# Patient Record
Sex: FEMALE | URBAN - METROPOLITAN AREA
[De-identification: names, ages, dates, MRNs, and addresses within clinical notes are randomized per-mention and may not be internally consistent; named-entity substitution may affect disease eponyms.]

---

## 2018-08-24 ENCOUNTER — NEW PATIENT (OUTPATIENT)
Dept: URBAN - METROPOLITAN AREA CLINIC 52 | Facility: CLINIC | Age: 53
End: 2018-08-24

## 2018-08-24 VITALS — HEART RATE: 68 BPM | SYSTOLIC BLOOD PRESSURE: 156 MMHG | HEIGHT: 55 IN | DIASTOLIC BLOOD PRESSURE: 81 MMHG

## 2018-08-24 DIAGNOSIS — H43.393: ICD-10-CM

## 2018-08-24 DIAGNOSIS — Z79.4: ICD-10-CM

## 2018-08-24 DIAGNOSIS — E11.3213: ICD-10-CM

## 2018-08-24 DIAGNOSIS — H25.13: ICD-10-CM

## 2018-08-24 PROCEDURE — 99204 OFFICE O/P NEW MOD 45 MIN: CPT

## 2018-08-24 PROCEDURE — 92134 CPTRZ OPH DX IMG PST SGM RTA: CPT

## 2018-08-24 PROCEDURE — G8427 DOCREV CUR MEDS BY ELIG CLIN: HCPCS

## 2018-08-24 PROCEDURE — G8482 FLU IMMUNIZE ORDER/ADMIN: HCPCS

## 2018-08-24 PROCEDURE — 1036F TOBACCO NON-USER: CPT

## 2018-08-24 PROCEDURE — 2022F DILAT RTA XM EVC RTNOPTHY: CPT

## 2018-08-24 PROCEDURE — 4040F PNEUMOC VAC/ADMIN/RCVD: CPT | Mod: 8P

## 2018-08-24 PROCEDURE — 92225 OPHTHALMOSCOPY (INITIAL): CPT

## 2018-08-24 ASSESSMENT — VISUAL ACUITY
OS_CC: 20/30-2
OS_CC: 20/50+2
OD_CC: 20/30-2
OD_CC: 20/30

## 2018-08-24 ASSESSMENT — TONOMETRY
OS_IOP_MMHG: 20
OD_IOP_MMHG: 22

## 2018-09-07 ENCOUNTER — FOLLOW UP (OUTPATIENT)
Dept: URBAN - METROPOLITAN AREA CLINIC 52 | Facility: CLINIC | Age: 53
End: 2018-09-07

## 2018-09-07 DIAGNOSIS — E11.3213: ICD-10-CM

## 2018-09-07 DIAGNOSIS — Z79.4: ICD-10-CM

## 2018-09-07 PROCEDURE — 92014 COMPRE OPH EXAM EST PT 1/>: CPT | Mod: 25

## 2018-09-07 PROCEDURE — 92250 FUNDUS PHOTOGRAPHY W/I&R: CPT

## 2018-09-07 PROCEDURE — G8397 DIL MACULA/FUNDUS EXAM/W DOC: HCPCS

## 2018-09-07 PROCEDURE — 2021F DILAT MACULAR EXAM DONE: CPT

## 2018-09-07 PROCEDURE — 1036F TOBACCO NON-USER: CPT

## 2018-09-07 PROCEDURE — 5010F MACUL RESULT PHY/QHP MNG DM: CPT

## 2018-09-07 PROCEDURE — G8482 FLU IMMUNIZE ORDER/ADMIN: HCPCS

## 2018-09-07 PROCEDURE — G8427 DOCREV CUR MEDS BY ELIG CLIN: HCPCS

## 2018-09-07 PROCEDURE — 2022F DILAT RTA XM EVC RTNOPTHY: CPT

## 2018-09-07 PROCEDURE — G9903 PT SCRN TBCO ID AS NON USER: HCPCS

## 2018-09-07 PROCEDURE — 67028 INJECTION EYE DRUG: CPT

## 2018-09-07 PROCEDURE — 4040F PNEUMOC VAC/ADMIN/RCVD: CPT | Mod: 8P

## 2018-09-07 PROCEDURE — 92235 FLUORESCEIN ANGRPH MLTIFRAME: CPT

## 2018-09-07 ASSESSMENT — VISUAL ACUITY
OD_CC: 20/40
OS_CC: 20/30

## 2018-09-07 ASSESSMENT — TONOMETRY
OS_IOP_MMHG: 14
OD_IOP_MMHG: 18

## 2018-09-21 ENCOUNTER — PROCEDURE ONLY (OUTPATIENT)
Dept: URBAN - METROPOLITAN AREA CLINIC 52 | Facility: CLINIC | Age: 53
End: 2018-09-21

## 2018-09-21 DIAGNOSIS — E11.3213: ICD-10-CM

## 2018-09-21 DIAGNOSIS — Z79.4: ICD-10-CM

## 2018-09-21 PROCEDURE — 2022F DILAT RTA XM EVC RTNOPTHY: CPT

## 2018-09-21 PROCEDURE — G8427 DOCREV CUR MEDS BY ELIG CLIN: HCPCS

## 2018-09-21 PROCEDURE — 4040F PNEUMOC VAC/ADMIN/RCVD: CPT | Mod: 8P

## 2018-09-21 PROCEDURE — G8397 DIL MACULA/FUNDUS EXAM/W DOC: HCPCS

## 2018-09-21 PROCEDURE — 67210 TREATMENT OF RETINAL LESION: CPT

## 2018-09-21 PROCEDURE — 1036F TOBACCO NON-USER: CPT

## 2018-09-21 PROCEDURE — G8482 FLU IMMUNIZE ORDER/ADMIN: HCPCS

## 2018-09-21 PROCEDURE — G9903 PT SCRN TBCO ID AS NON USER: HCPCS

## 2018-09-21 PROCEDURE — 5010F MACUL RESULT PHY/QHP MNG DM: CPT

## 2018-09-21 ASSESSMENT — TONOMETRY
OS_IOP_MMHG: 13
OD_IOP_MMHG: 16

## 2018-09-21 ASSESSMENT — VISUAL ACUITY
OD_CC: 20/40
OS_CC: 20/30-1

## 2018-10-05 ENCOUNTER — FOLLOW UP (OUTPATIENT)
Dept: URBAN - METROPOLITAN AREA CLINIC 52 | Facility: CLINIC | Age: 53
End: 2018-10-05

## 2018-10-05 DIAGNOSIS — H43.393: ICD-10-CM

## 2018-10-05 DIAGNOSIS — E11.3213: ICD-10-CM

## 2018-10-05 DIAGNOSIS — Z79.4: ICD-10-CM

## 2018-10-05 PROCEDURE — G8482 FLU IMMUNIZE ORDER/ADMIN: HCPCS

## 2018-10-05 PROCEDURE — 92012 INTRM OPH EXAM EST PATIENT: CPT | Mod: 24,25

## 2018-10-05 PROCEDURE — G8397 DIL MACULA/FUNDUS EXAM/W DOC: HCPCS

## 2018-10-05 PROCEDURE — 2021F DILAT MACULAR EXAM DONE: CPT

## 2018-10-05 PROCEDURE — 2022F DILAT RTA XM EVC RTNOPTHY: CPT

## 2018-10-05 PROCEDURE — G8427 DOCREV CUR MEDS BY ELIG CLIN: HCPCS

## 2018-10-05 PROCEDURE — 92134 CPTRZ OPH DX IMG PST SGM RTA: CPT

## 2018-10-05 PROCEDURE — 5010F MACUL RESULT PHY/QHP MNG DM: CPT

## 2018-10-05 PROCEDURE — 67028 INJECTION EYE DRUG: CPT

## 2018-10-05 PROCEDURE — 1036F TOBACCO NON-USER: CPT

## 2018-10-05 PROCEDURE — G9903 PT SCRN TBCO ID AS NON USER: HCPCS

## 2018-10-05 PROCEDURE — 4040F PNEUMOC VAC/ADMIN/RCVD: CPT | Mod: 8P

## 2018-10-05 ASSESSMENT — TONOMETRY
OD_IOP_MMHG: 19
OS_IOP_MMHG: 17

## 2018-10-05 ASSESSMENT — VISUAL ACUITY
OD_CC: 20/30-2
OS_CC: 20/40-

## 2018-12-07 ENCOUNTER — FOLLOW UP (OUTPATIENT)
Dept: URBAN - METROPOLITAN AREA CLINIC 52 | Facility: CLINIC | Age: 53
End: 2018-12-07

## 2018-12-07 DIAGNOSIS — Z79.4: ICD-10-CM

## 2018-12-07 DIAGNOSIS — H25.13: ICD-10-CM

## 2018-12-07 DIAGNOSIS — H43.393: ICD-10-CM

## 2018-12-07 DIAGNOSIS — E11.3213: ICD-10-CM

## 2018-12-07 PROCEDURE — 67028 INJECTION EYE DRUG: CPT

## 2018-12-07 PROCEDURE — 4040F PNEUMOC VAC/ADMIN/RCVD: CPT | Mod: 8P

## 2018-12-07 PROCEDURE — 5010F MACUL RESULT PHY/QHP MNG DM: CPT

## 2018-12-07 PROCEDURE — 92012 INTRM OPH EXAM EST PATIENT: CPT | Mod: 25

## 2018-12-07 PROCEDURE — 2021F DILAT MACULAR EXAM DONE: CPT

## 2018-12-07 PROCEDURE — 92134 CPTRZ OPH DX IMG PST SGM RTA: CPT

## 2018-12-07 PROCEDURE — G8397 DIL MACULA/FUNDUS EXAM/W DOC: HCPCS

## 2018-12-07 PROCEDURE — 2022F DILAT RTA XM EVC RTNOPTHY: CPT

## 2018-12-07 PROCEDURE — 1036F TOBACCO NON-USER: CPT

## 2018-12-07 PROCEDURE — G8427 DOCREV CUR MEDS BY ELIG CLIN: HCPCS

## 2018-12-07 PROCEDURE — G8482 FLU IMMUNIZE ORDER/ADMIN: HCPCS

## 2018-12-07 PROCEDURE — G9903 PT SCRN TBCO ID AS NON USER: HCPCS

## 2018-12-07 ASSESSMENT — TONOMETRY
OD_IOP_MMHG: 20
OS_IOP_MMHG: 22

## 2018-12-07 ASSESSMENT — VISUAL ACUITY
OS_CC: 20/50-
OD_CC: 20/40-
OS_PH: 20/30-
OD_CC: 20/400
OD_PH: 20/30
OS_CC: 20/400

## 2019-02-26 ENCOUNTER — FOLLOW UP (OUTPATIENT)
Dept: URBAN - METROPOLITAN AREA CLINIC 52 | Facility: CLINIC | Age: 54
End: 2019-02-26

## 2019-02-26 DIAGNOSIS — Z79.4: ICD-10-CM

## 2019-02-26 DIAGNOSIS — E11.3312: ICD-10-CM

## 2019-02-26 DIAGNOSIS — E11.3391: ICD-10-CM

## 2019-02-26 PROCEDURE — 92134 CPTRZ OPH DX IMG PST SGM RTA: CPT

## 2019-02-26 PROCEDURE — 92226 OPHTHALMOSCOPY (SUB): CPT

## 2019-02-26 PROCEDURE — 92014 COMPRE OPH EXAM EST PT 1/>: CPT

## 2019-02-26 ASSESSMENT — TONOMETRY
OD_IOP_MMHG: 19
OS_IOP_MMHG: 19

## 2019-02-26 ASSESSMENT — VISUAL ACUITY
OD_CC: 20/25
OS_CC: 20/40
OS_SC: 20/50
OD_SC: 20/30

## 2019-04-26 ENCOUNTER — FOLLOW UP (OUTPATIENT)
Dept: URBAN - METROPOLITAN AREA CLINIC 52 | Facility: CLINIC | Age: 54
End: 2019-04-26

## 2019-04-26 DIAGNOSIS — Z79.4: ICD-10-CM

## 2019-04-26 DIAGNOSIS — E11.3312: ICD-10-CM

## 2019-04-26 DIAGNOSIS — E11.3391: ICD-10-CM

## 2019-04-26 PROCEDURE — 99213 OFFICE O/P EST LOW 20 MIN: CPT

## 2019-04-26 PROCEDURE — 92134 CPTRZ OPH DX IMG PST SGM RTA: CPT

## 2019-04-26 ASSESSMENT — VISUAL ACUITY
OD_PH: 20/30
OD_SC: 20/40
OS_CC: 20/40
OD_CC: 20/40
OS_PH: 20/25
OS_SC: 20/50-1

## 2019-04-26 ASSESSMENT — TONOMETRY
OS_IOP_MMHG: 16
OD_IOP_MMHG: 17

## 2019-07-26 ENCOUNTER — FOLLOW UP (OUTPATIENT)
Dept: URBAN - METROPOLITAN AREA CLINIC 52 | Facility: CLINIC | Age: 54
End: 2019-07-26

## 2019-07-26 DIAGNOSIS — E11.3391: ICD-10-CM

## 2019-07-26 DIAGNOSIS — H25.13: ICD-10-CM

## 2019-07-26 DIAGNOSIS — E11.3312: ICD-10-CM

## 2019-07-26 DIAGNOSIS — H43.393: ICD-10-CM

## 2019-07-26 PROCEDURE — 92134 CPTRZ OPH DX IMG PST SGM RTA: CPT

## 2019-07-26 PROCEDURE — 92014 COMPRE OPH EXAM EST PT 1/>: CPT

## 2019-07-26 PROCEDURE — 92226 OPHTHALMOSCOPY (SUB): CPT

## 2019-07-26 ASSESSMENT — VISUAL ACUITY
OD_CC: 20/50
OS_CC: 20/50
OS_CC: 20/40
OD_CC: 20/40

## 2019-07-26 ASSESSMENT — TONOMETRY
OD_IOP_MMHG: 18
OS_IOP_MMHG: 15

## 2020-07-16 ENCOUNTER — IMPORTED ENCOUNTER (OUTPATIENT)
Dept: URBAN - METROPOLITAN AREA CLINIC 50 | Facility: CLINIC | Age: 55
End: 2020-07-16

## 2020-07-21 ENCOUNTER — IMPORTED ENCOUNTER (OUTPATIENT)
Dept: URBAN - METROPOLITAN AREA CLINIC 50 | Facility: CLINIC | Age: 55
End: 2020-07-21

## 2020-07-21 NOTE — PATIENT DISCUSSION
"""Type 2 diabetic eye exam with dilation. Mild diabetic retinopathy found.  Macular edema is not ""

## 2020-09-03 ENCOUNTER — IMPORTED ENCOUNTER (OUTPATIENT)
Dept: URBAN - METROPOLITAN AREA CLINIC 50 | Facility: CLINIC | Age: 55
End: 2020-09-03

## 2021-04-23 ASSESSMENT — VISUAL ACUITY
OD_CC: 20/30-2
OS_CC: 20/50-2
OS_OTHER: 20/60-. >20/400.
OS_CC: J4@ 16 IN
OD_OTHER: 20/100. >20/400.
OD_CC: J4@ 16 IN
OD_BAT: 20/100
OS_PH: 20/40
OS_BAT: 20/60-

## 2021-04-23 ASSESSMENT — TONOMETRY
OS_IOP_MMHG: 17
OD_IOP_MMHG: 18

## 2021-08-13 ENCOUNTER — PREPPED CHART (OUTPATIENT)
Dept: URBAN - METROPOLITAN AREA CLINIC 52 | Facility: CLINIC | Age: 56
End: 2021-08-13

## 2021-08-31 ENCOUNTER — CATARACT EVALUATION (OUTPATIENT)
Dept: URBAN - METROPOLITAN AREA CLINIC 52 | Facility: CLINIC | Age: 56
End: 2021-08-31

## 2021-08-31 DIAGNOSIS — H52.4: ICD-10-CM

## 2021-08-31 DIAGNOSIS — H25.813: ICD-10-CM

## 2021-08-31 DIAGNOSIS — E11.3293: ICD-10-CM

## 2021-08-31 DIAGNOSIS — H16.223: ICD-10-CM

## 2021-08-31 DIAGNOSIS — H52.13: ICD-10-CM

## 2021-08-31 PROCEDURE — 92015 DETERMINE REFRACTIVE STATE: CPT

## 2021-08-31 PROCEDURE — 92134 CPTRZ OPH DX IMG PST SGM RTA: CPT

## 2021-08-31 PROCEDURE — 92014 COMPRE OPH EXAM EST PT 1/>: CPT

## 2021-08-31 ASSESSMENT — VISUAL ACUITY
OU_CC: 20/40
OD_PH: 20/30
OS_CC: 20/50-1
OD_CC: 20/50-1
OS_GLARE: 20/40
OU_SC: J4 @ 14IN
OD_SC: J4 @ 14IN
OD_GLARE: 20/30
OS_GLARE: 20/50
OS_PH: 20/40
OD_GLARE: 20/30
OS_SC: J7 @ 14IN
OS_SC: 20/80
OD_SC: 20/50

## 2021-08-31 ASSESSMENT — TONOMETRY
OD_IOP_MMHG: 16
OS_IOP_MMHG: 16

## 2021-08-31 NOTE — PATIENT DISCUSSION
NPDR followed by Dr. Mera Conner: The patient has clinical evidence of non-proliferative diabetic retinopathy. It was explained to the patient that disease progression is common and repeat examination within 6 to 12 months to monitor for progression was advised. The patient was encouraged to continue to manage their weight, diet, exercise, blood pressure and blood glucose. The patient should continue to follow up with their primary care physician and work to optimally control their Hgb A1c.